# Patient Record
Sex: FEMALE | Race: BLACK OR AFRICAN AMERICAN | URBAN - METROPOLITAN AREA
[De-identification: names, ages, dates, MRNs, and addresses within clinical notes are randomized per-mention and may not be internally consistent; named-entity substitution may affect disease eponyms.]

---

## 2020-03-12 ENCOUNTER — TRANSCRIBE ORDERS (OUTPATIENT)
Dept: URGENT CARE | Facility: CLINIC | Age: 30
End: 2020-03-12

## 2020-03-12 ENCOUNTER — APPOINTMENT (OUTPATIENT)
Dept: URGENT CARE | Facility: CLINIC | Age: 30
End: 2020-03-12

## 2020-03-12 DIAGNOSIS — Z02.1 PRE-EMPLOYMENT EXAMINATION: Primary | ICD-10-CM

## 2020-03-12 DIAGNOSIS — Z02.1 PRE-EMPLOYMENT EXAMINATION: ICD-10-CM

## 2020-03-12 PROCEDURE — 86787 VARICELLA-ZOSTER ANTIBODY: CPT

## 2020-03-12 PROCEDURE — 86480 TB TEST CELL IMMUN MEASURE: CPT

## 2020-03-16 LAB
GAMMA INTERFERON BACKGROUND BLD IA-ACNC: 0.06 IU/ML
M TB IFN-G BLD-IMP: NEGATIVE
M TB IFN-G CD4+ BCKGRND COR BLD-ACNC: 0.02 IU/ML
M TB IFN-G CD4+ BCKGRND COR BLD-ACNC: 0.05 IU/ML
MITOGEN IGNF BCKGRD COR BLD-ACNC: >10 IU/ML

## 2020-03-17 LAB — VZV IGG SER IA-ACNC: NORMAL

## 2021-01-09 ENCOUNTER — NURSE TRIAGE (OUTPATIENT)
Dept: OTHER | Facility: OTHER | Age: 31
End: 2021-01-09

## 2021-01-09 NOTE — TELEPHONE ENCOUNTER
Pt called in regarding prescription for Valtrex 1 gram once daily for 5 days  She states she poke with office yesterday and nothing called into pharmacy  Preferred pharmacy is [facility] on [facility] in [place]  Spoke with on-call Marco Hsieh, states she will call in the medication now  Pt aware  Regarding: Refill- Valtrex   ----- Message from Sophia Macias MA sent at 1/9/2021 11:50 AM EST -----  I'm calling because I need my Prescription Valtrex 1 gram to be sent to pharmacy I called yesterday and nobody has sent my medication

## 2021-02-11 ENCOUNTER — OFFICE VISIT (OUTPATIENT)
Dept: GASTROENTEROLOGY | Facility: CLINIC | Age: 31
End: 2021-02-11
Payer: COMMERCIAL

## 2021-02-11 VITALS
WEIGHT: 240 LBS | HEART RATE: 83 BPM | BODY MASS INDEX: 39.99 KG/M2 | SYSTOLIC BLOOD PRESSURE: 132 MMHG | HEIGHT: 65 IN | DIASTOLIC BLOOD PRESSURE: 91 MMHG

## 2021-02-11 DIAGNOSIS — Z72.0 TOBACCO USE: ICD-10-CM

## 2021-02-11 DIAGNOSIS — R74.8 ELEVATED ALKALINE PHOSPHATASE LEVEL: Primary | ICD-10-CM

## 2021-02-11 DIAGNOSIS — K21.9 GASTROESOPHAGEAL REFLUX DISEASE WITHOUT ESOPHAGITIS: ICD-10-CM

## 2021-02-11 DIAGNOSIS — K76.9 LIVER LESION: ICD-10-CM

## 2021-02-11 PROBLEM — B00.9 HERPES SIMPLEX: Status: ACTIVE | Noted: 2019-06-28

## 2021-02-11 PROCEDURE — 99244 OFF/OP CNSLTJ NEW/EST MOD 40: CPT | Performed by: NURSE PRACTITIONER

## 2021-02-11 RX ORDER — CHOLECALCIFEROL (VITAMIN D3) 125 MCG
2000 CAPSULE ORAL DAILY
COMMUNITY
Start: 2021-01-20

## 2021-02-11 RX ORDER — VALACYCLOVIR HYDROCHLORIDE 1 G/1
TABLET, FILM COATED ORAL
COMMUNITY
Start: 2021-01-09

## 2021-02-11 RX ORDER — COPPER 313.4 MG/1
1 INTRAUTERINE DEVICE INTRAUTERINE
COMMUNITY

## 2021-02-11 NOTE — PROGRESS NOTES
[facility] - Outpatient Consultation  Shanna Javier 35 y o  female MRN: [id number]  Encounter: [id number]          ASSESSMENT AND PLAN:      1  Elevated alkaline phosphatase level  Elevated alk phos level may be secondary to vitamin-D deficiency or previous infection  All other liver enzymes are within normal limits  Patienthad workup for lunderlying liver disease concerning alk phos  Workup to this point has been negative except MRI of abdomen showed 5 mm lesion segment 8 liver dome  This is too small to adequately characterize  Patient was recommended to have a follow-up MRI in 6 months with of Eovist contrast   Will obtain the following repeat lab work in 3 months:  - Hepatic function panel; Future  - Gamma GT; Future    2  Liver lesion  Liver lesion found on MRI abdomen showed 5 mm lesion segment 8 liver dome  This is too small to adequately characterize  Patient also has elevated alk phos level, all other liver enzymes are within normal limits   Patient had workup for underlying  liver disease concerning alk phos  Workup to this point has been negative  Patient was recommended to have a follow-up MRI in 6 months with of Eovist contrast   Will obtain the following repeat lab work in 3 months:  - Hepatic function panel; Future  - Gamma GT; Future    3  Tobacco use  Patient currently smokes  Counseling provided on smoking  Patient advised to stop smoking this can aggravate symptoms of acid reflux and heartburn  4  Gastroesophageal reflux disease without esophagitis  Patient has rare episodes of acid reflux and heartburn associated with mild epigastric discomfort  Patient reports she takes Metamucil and that seemed to help her symptoms  Patient stated she tried over-the-counter medications for heartburn and acid reflux but they did not help her  Anti-reflux diet and anti-reflux measures reviewed with patient advised to follow    Patient informed that if her symptoms increase she can be started on prescription medication to control it and further evaluation can be done within EGD  At this time patient defers any further treatment due to the rarity of symptoms  Follow up in 4 months     ______________________________________________________________________    HPI:    Fide Martinez is of 71-year-old female who presents to office for elevated alk phos which has been ongoing over the last year  Patient had thorough workup done as outpatient prior to presenting to GI for elevated alk-phos  Hepatitis panel negative  IRVING negative, IRVING negative, and ASMA negative  Patient had an ultrasound of the abdomen done 10/14/2019 which showed no focal liver disease, no biliary disease, no evidence of pancreatitis  Nuclear medicine HIDA scan done 10/18/2019 showed normal contraction of the gallbladder with CCK infusion gallbladder junction fraction 83%  Patient also had a recent MRI of the abdomen which showed a 5 mm lesion in segment 8 liver dome  This is too small to adequately characterize  A follow-up MRI in 6 months with Eovist contrast is recommended for further characterization  Patient does have a history of vitamin-D deficiency for which she takes vitamin-D supplements  Patient does report some rare episodes of acid reflux upper and epigastric discomfort but she states this has improved with use of Metamucil  She states she has tried Tums and other over-the-counter remedies for the symptoms which did not help  Patient currently denies any epigastric pain but does get some minor discomfort on rare instances  Denies nausea, vomiting, dysphagia, and lower abdominal pain  Patient reports bowel patterns are regular  Denies diarrhea or constipation  Patient denies blood in stool, blood from rectal area, or black tarry stool  Patient does smoke  Patient only drinks alcohol socially on very rare occasions  Patient denies any drug use  No family history of gastric or colon cancer  Patient has never had an EGD or colonoscopy  REVIEW OF SYSTEMS:    CONSTITUTIONAL: Denies any fever, chills, rigors, and weight loss  HEENT: No earache or tinnitus  Denies hearing loss or visual disturbances  CARDIOVASCULAR: No chest pain or palpitations  RESPIRATORY: Denies any cough, hemoptysis, shortness of breath or dyspnea on exertion  GASTROINTESTINAL: As noted in the History of Present Illness  GENITOURINARY: No problems with urination  Denies any hematuria or dysuria  NEUROLOGIC: No dizziness or vertigo, denies headaches  MUSCULOSKELETAL: Denies any muscle or joint pain  SKIN: Denies skin rashes or itching  ENDOCRINE: Denies excessive thirst  Denies intolerance to heat or cold  PSYCHOSOCIAL: Denies depression or anxiety  Denies any recent memory loss  Historical Information   History reviewed  No pertinent past medical history  History reviewed  No pertinent surgical history  Social History   Social History     Substance and Sexual Activity   Alcohol Use Yes    Frequency: Monthly or less     Social History     Substance and Sexual Activity   Drug Use Never     Social History     Tobacco Use   Smoking Status Current Every Day Smoker   Smokeless Tobacco Never Used     Family History   Problem Relation Age of Onset    No Known Problems Mother     No Known Problems Father        Meds/Allergies       Current Outpatient Medications:     Cholecalciferol (Vitamin D3) 50 MCG (2000 UT) TABS    intrauterine copper (PARAGARD) IUD    valACYclovir (VALTREX) 1,000 mg tablet    Allergies   Allergen Reactions    Penicillins Rash           Objective     Blood pressure 132/91, pulse 83, height 5' 5" (1 651 m), weight 109 kg (240 lb)  Body mass index is 39 94 kg/m²          PHYSICAL EXAM:      General Appearance:   Alert, cooperative, no distress   HEENT:   Normocephalic, atraumatic, anicteric      Neck:  Supple, symmetrical, trachea midline   Lungs:   Clear to auscultation bilaterally; no rales, rhonchi or wheezing; respirations unlabored    Heart[de-identified]   Regular rate and rhythm; no murmur, rub, or gallop  Abdomen:   Soft, non-tender, non-distended; normal bowel sounds; no masses, no organomegaly    Genitalia:   Deferred    Rectal:   Deferred    Extremities:  No cyanosis, clubbing or edema    Pulses:  2+ and symmetric    Skin:  No jaundice, rashes, or lesions    Lymph nodes:  No palpable cervical lymphadenopathy        Lab Results:   No visits with results within 1 Day(s) from this visit  Latest known visit with results is:   Appointment on 03/12/2020   Component Date Value    QFT Nil 03/12/2020 0 06     QFT TB1-NIL 03/12/2020 0 05     QFT TB2-NIL 03/12/2020 0 02     QFT Mitogen-NIL 03/12/2020 >10 00     QFT Final Interpretation 03/12/2020 Negative     Varicella IgG 03/12/2020 IMMUNE          Radiology Results:   No results found

## 2021-02-11 NOTE — ADDENDUM NOTE
Addended Tanvi Sandoval on: 2/11/2021 12:52 PM     Modules accepted: Yudy Kauffman
Addended byFelicita Maguire on: 2/11/2021 04:53 PM     Modules accepted: Level of Service
immune

## 2021-05-29 ENCOUNTER — NURSE TRIAGE (OUTPATIENT)
Dept: OTHER | Facility: OTHER | Age: 31
End: 2021-05-29

## 2021-05-29 DIAGNOSIS — B00.9 HERPES SIMPLEX: Primary | ICD-10-CM

## 2021-05-29 RX ORDER — VALACYCLOVIR HYDROCHLORIDE 1 G/1
1000 TABLET, FILM COATED ORAL DAILY
Qty: 5 TABLET | Refills: 0 | Status: SHIPPED | OUTPATIENT
Start: 2021-05-29 | End: 2021-06-03

## 2021-05-29 NOTE — TELEPHONE ENCOUNTER
Spoke to on call provider, informed of patient's s/s and stated ok to call in rx Valtrex 1000mg qd for 5 days  Patient informed and verbalized understanding

## 2021-05-29 NOTE — TELEPHONE ENCOUNTER
Reason for Disposition   [1] Caller requesting a NON-URGENT new prescription or refill AND [2] triager unable to refill per unit policy    Answer Assessment - Initial Assessment Questions  1  NAME of MEDICATION: "What medicine are you calling about?"      Valtrex    2  QUESTION: "What is your question?"      Need a refill    3  PRESCRIBING HCP: "Who prescribed it?" Reason: if prescribed by specialist, call should be referred to that group  Dr Nadiya Adam     4  SYMPTOMS: "Do you have any symptoms?"      Blister and burning orally and in a vaginal region, fatigue, lower abdominal pain     5  SEVERITY: If symptoms are present, ask "Are they mild, moderate or severe?"      Moderate     6    PREGNANCY:  "Is there any chance that you are pregnant?" "When was your last menstrual period?"    Protocols used: MEDICATION QUESTION CALL-ADULT-

## 2021-05-29 NOTE — TELEPHONE ENCOUNTER
Regarding: Requesting a refill for Valtrex  ----- Message from Kia Viveros sent at 5/29/2021  9:22 AM EDT -----  "I'm requesting a refill for my Valtrex   I'm currently in the state of [facility] and was wondering it the medication can be sent to a pharmacy closer to my location "

## 2021-05-29 NOTE — TELEPHONE ENCOUNTER
LVM for on call provider, informing of patient's s/s, and requesting a call back on triager's personal phone number